# Patient Record
Sex: FEMALE | Race: WHITE | Employment: OTHER | URBAN - METROPOLITAN AREA
[De-identification: names, ages, dates, MRNs, and addresses within clinical notes are randomized per-mention and may not be internally consistent; named-entity substitution may affect disease eponyms.]

---

## 2018-05-02 ENCOUNTER — HOSPITAL ENCOUNTER (OUTPATIENT)
Facility: HOSPITAL | Age: 68
Setting detail: OBSERVATION
Discharge: HOME | End: 2018-05-03
Attending: EMERGENCY MEDICINE | Admitting: INTERNAL MEDICINE
Payer: MEDICARE

## 2018-05-02 DIAGNOSIS — E86.0 DEHYDRATION: Primary | ICD-10-CM

## 2018-05-02 DIAGNOSIS — R11.2 NAUSEA VOMITING AND DIARRHEA: ICD-10-CM

## 2018-05-02 DIAGNOSIS — R55 SYNCOPE, NEAR: ICD-10-CM

## 2018-05-02 DIAGNOSIS — R19.7 NAUSEA VOMITING AND DIARRHEA: ICD-10-CM

## 2018-05-02 PROBLEM — E87.2 METABOLIC ACIDOSIS: Status: ACTIVE | Noted: 2018-05-02

## 2018-05-02 PROBLEM — R73.9 HYPERGLYCEMIA: Status: ACTIVE | Noted: 2018-05-02

## 2018-05-02 PROBLEM — E87.6 HYPOKALEMIA: Status: ACTIVE | Noted: 2018-05-02

## 2018-05-02 PROCEDURE — 85025 COMPLETE CBC W/AUTO DIFF WBC: CPT | Performed by: EMERGENCY MEDICINE

## 2018-05-02 PROCEDURE — 80048 BASIC METABOLIC PNL TOTAL CA: CPT | Performed by: EMERGENCY MEDICINE

## 2018-05-02 PROCEDURE — 96366 THER/PROPH/DIAG IV INF ADDON: CPT

## 2018-05-02 PROCEDURE — 96361 HYDRATE IV INFUSION ADD-ON: CPT

## 2018-05-02 PROCEDURE — 96374 THER/PROPH/DIAG INJ IV PUSH: CPT

## 2018-05-02 PROCEDURE — 96375 TX/PRO/DX INJ NEW DRUG ADDON: CPT

## 2018-05-02 PROCEDURE — 93010 ELECTROCARDIOGRAM REPORT: CPT | Performed by: EMERGENCY MEDICINE

## 2018-05-02 PROCEDURE — 93005 ELECTROCARDIOGRAM TRACING: CPT

## 2018-05-02 PROCEDURE — 99285 EMERGENCY DEPT VISIT HI MDM: CPT

## 2018-05-02 PROCEDURE — 96365 THER/PROPH/DIAG IV INF INIT: CPT

## 2018-05-02 RX ORDER — LEVOTHYROXINE SODIUM 0.05 MG/1
50 TABLET ORAL
COMMUNITY

## 2018-05-02 RX ORDER — ONDANSETRON 2 MG/ML
4 INJECTION INTRAMUSCULAR; INTRAVENOUS ONCE
Status: COMPLETED | OUTPATIENT
Start: 2018-05-02 | End: 2018-05-02

## 2018-05-02 RX ORDER — SODIUM CHLORIDE 9 MG/ML
INJECTION, SOLUTION INTRAVENOUS CONTINUOUS
Status: DISCONTINUED | OUTPATIENT
Start: 2018-05-02 | End: 2018-05-03

## 2018-05-02 RX ORDER — SODIUM CHLORIDE 9 MG/ML
INJECTION, SOLUTION INTRAVENOUS CONTINUOUS
Status: ACTIVE | OUTPATIENT
Start: 2018-05-02 | End: 2018-05-02

## 2018-05-02 RX ORDER — ONDANSETRON 2 MG/ML
4 INJECTION INTRAMUSCULAR; INTRAVENOUS EVERY 4 HOURS PRN
Status: DISCONTINUED | OUTPATIENT
Start: 2018-05-02 | End: 2018-05-03

## 2018-05-02 RX ORDER — LEVOTHYROXINE SODIUM 0.05 MG/1
50 TABLET ORAL
Status: DISCONTINUED | OUTPATIENT
Start: 2018-05-03 | End: 2018-05-03

## 2018-05-02 RX ORDER — PANTOPRAZOLE SODIUM 40 MG/1
40 TABLET, DELAYED RELEASE ORAL
Status: DISCONTINUED | OUTPATIENT
Start: 2018-05-03 | End: 2018-05-03

## 2018-05-02 NOTE — ED PROVIDER NOTES
Patient Seen in: [facility] AND [facility] Emergency Department    History   Patient presents with:  Nausea/Vomiting/Diarrhea (gastrointestinal)    Stated Complaint: nausea vomiting    HPI    She presents emergency department complaining of nausea vomiting and l Musculoskeletal: Normal range of motion. She exhibits no tenderness. Neurological: She is alert and oriented to person, place, and time. She has normal strength and normal reflexes. No cranial nerve deficit or sensory deficit.    Skin: Skin is warm and your blood pressure.     Medications Prescribed:  Current Discharge Medication List        Present on Admission           ICD-10-CM Noted POA    Dehydration E86.0 5/2/2018 Unknown    Hyperglycemia R73.9 5/2/2018 Yes    Hypokalemia E87.6 5/2/2018 Yes    [place]

## 2018-05-02 NOTE — ED INITIAL ASSESSMENT (HPI)
Patient via medics from a , visiting from [place].  Patient with N/V/dizziness    zofran given by medics

## 2018-05-02 NOTE — Clinical Note
Report given to floor RN Vannesa Cruz. Pt remains stable and without complaints of discomfort or pain. Pt test results endorsed to receiving RN. No additional concerns or questions at this time. Med rec completed.

## 2018-05-02 NOTE — ED NOTES
Pt up to restroom, changed her clothing as she had an accident (loose stool). Linen changed and Dr. Elis Cordova updated that pt wants to stay the night. Dr. Elis Cordova states he will be over to speak with pt.

## 2018-05-03 VITALS
HEART RATE: 68 BPM | DIASTOLIC BLOOD PRESSURE: 70 MMHG | WEIGHT: 168 LBS | HEIGHT: 64 IN | TEMPERATURE: 98 F | RESPIRATION RATE: 18 BRPM | BODY MASS INDEX: 28.68 KG/M2 | OXYGEN SATURATION: 98 % | SYSTOLIC BLOOD PRESSURE: 137 MMHG

## 2018-05-03 PROCEDURE — 96372 THER/PROPH/DIAG INJ SC/IM: CPT

## 2018-05-03 PROCEDURE — 85025 COMPLETE CBC W/AUTO DIFF WBC: CPT | Performed by: INTERNAL MEDICINE

## 2018-05-03 PROCEDURE — 96361 HYDRATE IV INFUSION ADD-ON: CPT

## 2018-05-03 PROCEDURE — 84132 ASSAY OF SERUM POTASSIUM: CPT | Performed by: INTERNAL MEDICINE

## 2018-05-03 PROCEDURE — 80053 COMPREHEN METABOLIC PANEL: CPT | Performed by: INTERNAL MEDICINE

## 2018-05-03 RX ORDER — ENOXAPARIN SODIUM 100 MG/ML
40 INJECTION SUBCUTANEOUS DAILY
Status: DISCONTINUED | OUTPATIENT
Start: 2018-05-03 | End: 2018-05-03

## 2018-05-03 NOTE — PROGRESS NOTES
[facility]    Progress Note    Rama Denise Patient Status:  Observation    1950 MRN [id number]   Location [address] [place] [address] Attending [address], [address] Day # 0 PCP PHYSICIAN NONSTAFF       Subjective:   Janes Coronado Interpretation  -------------------------- Sinus Rhythm -Left atrial enlargement.  BORDERLINE No previous ECGs available Electronically signed on 05/02/2018 at 15:13 by Lurlean Simmonds, MD          Assessment and Plan:     Dehydration  Improved       Hypokalemi

## 2018-05-03 NOTE — H&P
[facility]    PATIENT'S NAME: Murtaza Marx   ATTENDING PHYSICIAN: Iglesia Farrell MD   PATIENT ACCOUNT#:   [id number]    LOCATION:  [place] [address] RECORD #:   [id number]       YOB: 1950  ADMISSION DATE:       05/02 venous thrombosis prophylaxis. SCDs.     Dictated By Nimo Negro MD  d: 05/02/2018 23:02:15  t: 05/02/2018 23:53:19  Job [id number]  /

## 2018-05-03 NOTE — CONSULTS
REFERRING PHYSICIAN: Dr. Lowry ref. provider found    HPI:         Thank you very much for requesting me to see the patient.     As you know, Severa Pare is a 78year old female who presents today       PMH: hyperthyroidism;     PMH-Gi:     SOC:     FH

## 2018-05-03 NOTE — PLAN OF CARE
Problem: Patient/Family Goals  Goal: Patient/Family Long Term Goal  Patient's Long Term Goal: to be discharged    Interventions:  - IV fluids  -electrolyte protocol  - PRN zofran  - See additional Care Plan goals for specific interventions   Outcome: Progr

## 2018-05-03 NOTE — PLAN OF CARE
Patient is ambulatory. She is tolerating a soft diet. She denies pain or discomfort. IV fluids have been discontinued. Discharge instructions have been reviewed with patient and all questions have been answered.

## 2018-05-04 NOTE — DISCHARGE SUMMARY
[facility]    PATIENT'S NAME: Hira Hawthorne   ATTENDING PHYSICIAN: Maira Mendiola MD   PATIENT ACCOUNT#:   [id number]    LOCATION:  [place] [address] RECORD #:   [id number]       YOB: 1950  ADMISSION DATE:       05/02

## 2018-05-07 ENCOUNTER — HOSPITAL ENCOUNTER (EMERGENCY)
Facility: HOSPITAL | Age: 68
Discharge: STILL ADMITTED OR ADMINISTRATIVE | End: 2018-05-08
Payer: MEDICARE